# Patient Record
Sex: MALE | Race: WHITE | NOT HISPANIC OR LATINO | Employment: FULL TIME | URBAN - METROPOLITAN AREA
[De-identification: names, ages, dates, MRNs, and addresses within clinical notes are randomized per-mention and may not be internally consistent; named-entity substitution may affect disease eponyms.]

---

## 2021-06-02 ENCOUNTER — RECORDS - HEALTHEAST (OUTPATIENT)
Dept: ADMINISTRATIVE | Facility: CLINIC | Age: 61
End: 2021-06-02

## 2021-10-16 ENCOUNTER — HOSPITAL ENCOUNTER (EMERGENCY)
Facility: CLINIC | Age: 61
Discharge: HOME | End: 2021-10-17
Attending: EMERGENCY MEDICINE | Admitting: EMERGENCY MEDICINE
Payer: COMMERCIAL

## 2021-10-16 ENCOUNTER — APPOINTMENT (OUTPATIENT)
Dept: CT IMAGING | Facility: CLINIC | Age: 61
End: 2021-10-16
Attending: EMERGENCY MEDICINE
Payer: COMMERCIAL

## 2021-10-16 DIAGNOSIS — K59.00 CONSTIPATION, UNSPECIFIED CONSTIPATION TYPE: ICD-10-CM

## 2021-10-16 DIAGNOSIS — R16.0 LIVER MASS, RIGHT LOBE: ICD-10-CM

## 2021-10-16 LAB
ALBUMIN SERPL-MCNC: 3.9 G/DL (ref 3.5–5)
ALP SERPL-CCNC: 82 U/L (ref 45–120)
ALT SERPL W P-5'-P-CCNC: 19 U/L (ref 0–45)
ANION GAP SERPL CALCULATED.3IONS-SCNC: 9 MMOL/L (ref 5–18)
AST SERPL W P-5'-P-CCNC: 21 U/L (ref 0–40)
BASOPHILS # BLD AUTO: 0 10E3/UL (ref 0–0.2)
BASOPHILS NFR BLD AUTO: 0 %
BILIRUB SERPL-MCNC: 0.8 MG/DL (ref 0–1)
BUN SERPL-MCNC: 20 MG/DL (ref 8–22)
CALCIUM SERPL-MCNC: 9.3 MG/DL (ref 8.5–10.5)
CHLORIDE BLD-SCNC: 112 MMOL/L (ref 98–107)
CO2 SERPL-SCNC: 22 MMOL/L (ref 22–31)
CREAT SERPL-MCNC: 1.13 MG/DL (ref 0.7–1.3)
EOSINOPHIL # BLD AUTO: 0.1 10E3/UL (ref 0–0.7)
EOSINOPHIL NFR BLD AUTO: 2 %
ERYTHROCYTE [DISTWIDTH] IN BLOOD BY AUTOMATED COUNT: 13.3 % (ref 10–15)
GFR SERPL CREATININE-BSD FRML MDRD: 70 ML/MIN/1.73M2
GLUCOSE BLD-MCNC: 107 MG/DL (ref 70–125)
HCT VFR BLD AUTO: 41.8 % (ref 40–53)
HGB BLD-MCNC: 14 G/DL (ref 13.3–17.7)
IMM GRANULOCYTES # BLD: 0 10E3/UL
IMM GRANULOCYTES NFR BLD: 0 %
LYMPHOCYTES # BLD AUTO: 1.7 10E3/UL (ref 0.8–5.3)
LYMPHOCYTES NFR BLD AUTO: 23 %
MCH RBC QN AUTO: 29.9 PG (ref 26.5–33)
MCHC RBC AUTO-ENTMCNC: 33.5 G/DL (ref 31.5–36.5)
MCV RBC AUTO: 89 FL (ref 78–100)
MONOCYTES # BLD AUTO: 0.7 10E3/UL (ref 0–1.3)
MONOCYTES NFR BLD AUTO: 9 %
NEUTROPHILS # BLD AUTO: 4.8 10E3/UL (ref 1.6–8.3)
NEUTROPHILS NFR BLD AUTO: 66 %
NRBC # BLD AUTO: 0 10E3/UL
NRBC BLD AUTO-RTO: 0 /100
PLATELET # BLD AUTO: 132 10E3/UL (ref 150–450)
POTASSIUM BLD-SCNC: 4.4 MMOL/L (ref 3.5–5)
PROT SERPL-MCNC: 7 G/DL (ref 6–8)
RBC # BLD AUTO: 4.68 10E6/UL (ref 4.4–5.9)
SODIUM SERPL-SCNC: 143 MMOL/L (ref 136–145)
WBC # BLD AUTO: 7.3 10E3/UL (ref 4–11)

## 2021-10-16 PROCEDURE — 85025 COMPLETE CBC W/AUTO DIFF WBC: CPT | Performed by: EMERGENCY MEDICINE

## 2021-10-16 PROCEDURE — 36415 COLL VENOUS BLD VENIPUNCTURE: CPT | Performed by: EMERGENCY MEDICINE

## 2021-10-16 PROCEDURE — 99285 EMERGENCY DEPT VISIT HI MDM: CPT | Mod: 25

## 2021-10-16 PROCEDURE — 80053 COMPREHEN METABOLIC PANEL: CPT | Performed by: EMERGENCY MEDICINE

## 2021-10-16 PROCEDURE — 74176 CT ABD & PELVIS W/O CONTRAST: CPT

## 2021-10-16 ASSESSMENT — MIFFLIN-ST. JEOR: SCORE: 1847.72

## 2021-10-17 ENCOUNTER — APPOINTMENT (OUTPATIENT)
Dept: CT IMAGING | Facility: CLINIC | Age: 61
End: 2021-10-17
Attending: EMERGENCY MEDICINE
Payer: COMMERCIAL

## 2021-10-17 VITALS
OXYGEN SATURATION: 96 % | TEMPERATURE: 98.1 F | WEIGHT: 225 LBS | RESPIRATION RATE: 16 BRPM | SYSTOLIC BLOOD PRESSURE: 140 MMHG | DIASTOLIC BLOOD PRESSURE: 70 MMHG | HEIGHT: 71 IN | HEART RATE: 57 BPM | BODY MASS INDEX: 31.5 KG/M2

## 2021-10-17 LAB — RADIOLOGIST FLAGS: NORMAL

## 2021-10-17 PROCEDURE — 74177 CT ABD & PELVIS W/CONTRAST: CPT

## 2021-10-17 PROCEDURE — 250N000011 HC RX IP 250 OP 636: Performed by: EMERGENCY MEDICINE

## 2021-10-17 RX ORDER — IOPAMIDOL 755 MG/ML
100 INJECTION, SOLUTION INTRAVASCULAR ONCE
Status: COMPLETED | OUTPATIENT
Start: 2021-10-17 | End: 2021-10-17

## 2021-10-17 RX ADMIN — IOPAMIDOL 100 ML: 755 INJECTION, SOLUTION INTRAVENOUS at 00:15

## 2021-10-17 NOTE — ED PROVIDER NOTES
"ED SIGNOUT  Date/Time:10/16/2021 11:29 PM    Patient signed out to me, Dr. Jeff, by my colleague, Dr. Olivarez.  Please see their note for complete history and physical. Plan to follow up on CT Abdomen Pelvis with contrast and disposition.    The creation of this record is based on the scribe s observations of the work being performed by Anne Carlisle and the provider s statements to them. It was created on their behalf by Anne Carlisle a trained medical scribe. This document has been checked and approved by the attending provider.      REMAINING ED WORKUP:    Vitals:  BP (!) 141/71   Pulse 53   Temp 98.1  F (36.7  C) (Oral)   Resp 16   Ht 1.803 m (5' 11\")   Wt 102.1 kg (225 lb)   SpO2 97%   BMI 31.38 kg/m        Pertinent labs results reviewed   Results for orders placed or performed during the hospital encounter of 10/16/21   Abd/pelvis CT no contrast - Stone Protocol    Impression    IMPRESSION:   1.  Tiny, incidental fat-containing umbilical hernia.  2.  Constipation.  3.  Marked gastric distention with recent meal.  4.  Indeterminate low-attenuation mass right hepatic lobe. Repeat CT study after IV contrast recommended to further assess.     CT Abdomen Pelvis w Contrast    Impression    IMPRESSION:   1.  The large low-attenuation lesion in the right hepatic lobe is somewhat suboptimally evaluated given that there is no arterial phase imaging. However, its imaging characteristics are still most highly compatible with a benign hemangioma measuring 4.8   x 4.1 x 4.4 cm. There is peripheral globular enhancement, some of which is slightly more ill-defined at the inferior margin.    2.  There remains an indeterminant 1.3 cm lesion along the medial aspect of the right hepatic lobe which could represent a smaller hemangioma. Additionally, there is a 1 cm lesion along the left hepatic lobe lateral segment superiorly which remains   indeterminate as it is not well-visualized on the single delayed phase imaging due " to motion artifact. Given these findings and the lack of an arterial phase component to this study, recommend follow-up nonemergent MRI of the liver to confirm the   etiology of these two entities. Other tiny subcentimeter low-attenuation lesions possibly represent cysts and should be further confirmed on the MRI.    3.  1.6 cm right adrenal mass is indeterminate based off of this study. However, review of the noncontrast CT performed on 10/16/2021 at 8:48 PM demonstrates Hounsfield units for this lesion at less than 10 confirming a benign right adrenal adenoma for   which no long term follow-up is recommended.   Comprehensive metabolic panel   Result Value Ref Range    Sodium 143 136 - 145 mmol/L    Potassium 4.4 3.5 - 5.0 mmol/L    Chloride 112 (H) 98 - 107 mmol/L    Carbon Dioxide (CO2) 22 22 - 31 mmol/L    Anion Gap 9 5 - 18 mmol/L    Urea Nitrogen 20 8 - 22 mg/dL    Creatinine 1.13 0.70 - 1.30 mg/dL    Calcium 9.3 8.5 - 10.5 mg/dL    Glucose 107 70 - 125 mg/dL    Alkaline Phosphatase 82 45 - 120 U/L    AST 21 0 - 40 U/L    ALT 19 0 - 45 U/L    Protein Total 7.0 6.0 - 8.0 g/dL    Albumin 3.9 3.5 - 5.0 g/dL    Bilirubin Total 0.8 0.0 - 1.0 mg/dL    GFR Estimate 70 >60 mL/min/1.73m2   CBC with platelets and differential   Result Value Ref Range    WBC Count 7.3 4.0 - 11.0 10e3/uL    RBC Count 4.68 4.40 - 5.90 10e6/uL    Hemoglobin 14.0 13.3 - 17.7 g/dL    Hematocrit 41.8 40.0 - 53.0 %    MCV 89 78 - 100 fL    MCH 29.9 26.5 - 33.0 pg    MCHC 33.5 31.5 - 36.5 g/dL    RDW 13.3 10.0 - 15.0 %    Platelet Count 132 (L) 150 - 450 10e3/uL    % Neutrophils 66 %    % Lymphocytes 23 %    % Monocytes 9 %    % Eosinophils 2 %    % Basophils 0 %    % Immature Granulocytes 0 %    NRBCs per 100 WBC 0 <1 /100    Absolute Neutrophils 4.8 1.6 - 8.3 10e3/uL    Absolute Lymphocytes 1.7 0.8 - 5.3 10e3/uL    Absolute Monocytes 0.7 0.0 - 1.3 10e3/uL    Absolute Eosinophils 0.1 0.0 - 0.7 10e3/uL    Absolute Basophils 0.0 0.0 - 0.2 10e3/uL     Absolute Immature Granulocytes 0.0 <=0.0 10e3/uL    Absolute NRBCs 0.0 10e3/uL       Pertinent imaging reviewed   Please see official radiology report.  CT Abdomen Pelvis w Contrast   Preliminary Result   IMPRESSION:    1.  The large low-attenuation lesion in the right hepatic lobe is somewhat suboptimally evaluated given that there is no arterial phase imaging. However, its imaging characteristics are still most highly compatible with a benign hemangioma measuring 4.8    x 4.1 x 4.4 cm. There is peripheral globular enhancement, some of which is slightly more ill-defined at the inferior margin.      2.  There remains an indeterminant 1.3 cm lesion along the medial aspect of the right hepatic lobe which could represent a smaller hemangioma. Additionally, there is a 1 cm lesion along the left hepatic lobe lateral segment superiorly which remains    indeterminate as it is not well-visualized on the single delayed phase imaging due to motion artifact. Given these findings and the lack of an arterial phase component to this study, recommend follow-up nonemergent MRI of the liver to confirm the    etiology of these two entities. Other tiny subcentimeter low-attenuation lesions possibly represent cysts and should be further confirmed on the MRI.      3.  1.6 cm right adrenal mass is indeterminate based off of this study. However, review of the noncontrast CT performed on 10/16/2021 at 8:48 PM demonstrates Hounsfield units for this lesion at less than 10 confirming a benign right adrenal adenoma for    which no long term follow-up is recommended.      Abd/pelvis CT no contrast - Stone Protocol   Final Result   IMPRESSION:    1.  Tiny, incidental fat-containing umbilical hernia.   2.  Constipation.   3.  Marked gastric distention with recent meal.   4.  Indeterminate low-attenuation mass right hepatic lobe. Repeat CT study after IV contrast recommended to further assess.              Interventions  Medications   iopamidol  (ISOVUE-370) solution 100 mL (100 mLs Intravenous Given 10/17/21 0015)        ED Course/MDM:  11:30 PM Signout accepted from Dr. Olivarez.  Prior records were reviewed.  Diagnostics from this visit are reviewed.  1:14 AM I rechecked and introduced myself to the patient.     CT scans here without any acute process noted.  CT scan with contrast shows likely hemangioma.  May need outpatient routine follow-up MRI for confirmation but otherwise no focal or acute issues noted.  Went through the CT scans with patient in the room.  Showed him that he does have some significant constipation.  Discussed with him he can try magnesium citrate or MiraLAX which he can buy over-the-counter and he is going to try that at home.  Otherwise has an appointment coming up with primary care within the next week.  Will discharge at this time with close follow-up.           1. Liver mass, right lobe    2. Constipation, unspecified constipation type          Becky Jeff MD  [facility] Emergency Department        Erica Jeff MD  10/17/21 0125

## 2021-10-17 NOTE — ED PROVIDER NOTES
"EMERGENCY DEPARTMENT ENCOUnter      NAME: Sam Segundo  AGE: 61 year old male  YOB: 1960  MRN: [id number]  EVALUATION DATE & TIME: 10/16/2021  8:19 PM    PCP: No primary care provider on file.    ED PROVIDER: Maribell Olivarez MD      Chief Complaint   Patient presents with     Left side Abdominal swelling         FINAL IMPRESSION:  1. Liver mass, right lobe    2. Constipation, unspecified constipation type          ED COURSE & MEDICAL DECISION MAKING:      In summary, the patient is a 61-year-old male that presents to the emergency department for evaluation of a possible abdominal mass on his left side. No left-sided mass was noted on the CT scan, however a hepatic lesion was noted and a CT with contrast was recommended.    8:28 PM I met with the patient, obtained history, performed an initial exam, and discussed options and plan for diagnostics and treatment here in the ED.  9:53 PM I updated patient.   2320-and out at change of shift with CT abdomen pelvis with contrast pending    At the conclusion of the encounter I discussed the results of all of the tests and the disposition. The questions were answered. The patient or family acknowledged understanding and was agreeable with the care plan.     MEDICATIONS GIVEN IN THE EMERGENCY:  Medications - No data to display    NEW PRESCRIPTIONS STARTED AT TODAY'S ER VISIT  New Prescriptions    No medications on file          =================================================================    HPI        Sam Segundo is a 61 year old male with a pertinent history of dyslipidemia, HTN, paroxysmal atrial fibrillation, who presents to this ED via walk-in for evaluation of left-side abdominal swelling.     Patient reports \"pouch sticking out\" on left lower abdomen that he noticed earlier today. Denies any associating pain. Patient reports he felt fine earlier today before he noticed it. He reports decreased bowel movements for the past two days, which " "is unusual for patient as he states they are \"clockwork.\"  He notes his last bowel movement was today but less than usual.  Per wife, shortly after arrival to ED, patient's \"pouch\" was pulsating.    He reports back pain for the past couple days. Patient reports history of atrial fibrillation in remission after prior ablation. Denies any abdominal surgeries. Patient is currently taking fish oil and baby aspirin every day. Patient is not on blood thinners. Patient denies any alcohol use, smoking, or allergies to medications. No other complaints at this time.     REVIEW OF SYSTEMS     Constitutional:  Denies fever or chills  HENT:  Denies sore throat   Respiratory:  Denies cough or shortness of breath   Cardiovascular:  Denies chest pain or palpitations  GI:  Denies abdominal pain, nausea, or vomiting. Positive for abdominal \"pouch\" on left lower quadrant and decreased bowel movements.  Musculoskeletal:  Denies any new extremity pain. Positive for back pain.  Skin:  Denies rash   Neurologic:  Denies headache, focal weakness or sensory changes    All other systems reviewed and are negative      PAST MEDICAL HISTORY:  History reviewed. No pertinent past medical history.    PAST SURGICAL HISTORY:  History reviewed. No pertinent surgical history.        CURRENT MEDICATIONS:    aspirin 81 MG EC tablet  cholecalciferol, vitamin D3, 1,000 unit tablet  fenofibrate (TRIGLIDE) 50 MG tablet  melatonin 3 mg Tab tablet  multivitamin therapeutic (THERAGRAN) tablet  OMEGA-3/DHA/EPA/FISH OIL (FISH OIL-OMEGA-3 FATTY ACIDS) 300-1,000 mg capsule        ALLERGIES:  No Known Allergies    FAMILY HISTORY:  History reviewed. No pertinent family history.    SOCIAL HISTORY:   Social History     Socioeconomic History     Marital status:      Spouse name: None     Number of children: None     Years of education: None     Highest education level: None   Occupational History     None   Tobacco Use     Smoking status: None   Substance and " "Sexual Activity     Alcohol use: None     Drug use: None     Sexual activity: None   Other Topics Concern     None   Social History Narrative     None     Social Determinants of Health     Financial Resource Strain:      Difficulty of Paying Living Expenses:    Food Insecurity:      Worried About Running Out of Food in the Last Year:      Ran Out of Food in the Last Year:    Transportation Needs:      Lack of Transportation (Medical):      Lack of Transportation (Non-Medical):    Physical Activity:      Days of Exercise per Week:      Minutes of Exercise per Session:    Stress:      Feeling of Stress :    Social Connections:      Frequency of Communication with Friends and Family:      Frequency of Social Gatherings with Friends and Family:      Attends Scientologist Services:      Active Member of Clubs or Organizations:      Attends Club or Organization Meetings:      Marital Status:    Intimate Partner Violence:      Fear of Current or Ex-Partner:      Emotionally Abused:      Physically Abused:      Sexually Abused:        VITALS:  Patient Vitals for the past 24 hrs:   BP Temp Temp src Pulse Resp SpO2 Height Weight   10/16/21 2209 -- -- -- 56 -- 96 % -- --   10/16/21 2030 (!) 141/71 -- -- 52 -- 96 % -- --   10/16/21 2026 (!) 150/72 -- -- 54 -- 98 % -- --   10/16/21 1822 (!) 169/87 98.1  F (36.7  C) Oral 55 16 99 % 1.803 m (5' 11\") 102.1 kg (225 lb)       PHYSICAL EXAM    Constitutional:  Well developed, Well nourished,  HENT:  Normocephalic, Atraumatic, Bilateral external ears normal, Oropharynx moist, Nose normal.   Neck:  Normal range of motion, No meningismus, No stridor.   Eyes:  EOMI, Conjunctiva normal, No discharge.   Respiratory:  Normal breath sounds, No respiratory distress, No wheezing, No chest tenderness.   Cardiovascular:  Normal heart rate, Normal rhythm, No murmurs  GI:  Soft, No tenderness, No guarding, No CVA tenderness.   Musculoskeletal:  Neurovascularly intact distally, No edema, No tenderness, " No cyanosis, Good range of motion in all major joints. No tenderness to palpation or major deformities noted.   Integument:  Warm, Dry, No erythema, No rash.   Lymphatic:  No lymphadenopathy noted.   Neurologic:  Alert & oriented x 3, Normal motor function, Normal sensory function, No focal deficits noted.   Psychiatric:  Affect normal, Judgment normal, Mood normal.      LAB:  All pertinent labs reviewed and interpreted.  Results for orders placed or performed during the hospital encounter of 10/16/21   Abd/pelvis CT no contrast - Stone Protocol    Impression    IMPRESSION:   1.  Tiny, incidental fat-containing umbilical hernia.  2.  Constipation.  3.  Marked gastric distention with recent meal.  4.  Indeterminate low-attenuation mass right hepatic lobe. Repeat CT study after IV contrast recommended to further assess.     Comprehensive metabolic panel   Result Value Ref Range    Sodium 143 136 - 145 mmol/L    Potassium 4.4 3.5 - 5.0 mmol/L    Chloride 112 (H) 98 - 107 mmol/L    Carbon Dioxide (CO2) 22 22 - 31 mmol/L    Anion Gap 9 5 - 18 mmol/L    Urea Nitrogen 20 8 - 22 mg/dL    Creatinine 1.13 0.70 - 1.30 mg/dL    Calcium 9.3 8.5 - 10.5 mg/dL    Glucose 107 70 - 125 mg/dL    Alkaline Phosphatase 82 45 - 120 U/L    AST 21 0 - 40 U/L    ALT 19 0 - 45 U/L    Protein Total 7.0 6.0 - 8.0 g/dL    Albumin 3.9 3.5 - 5.0 g/dL    Bilirubin Total 0.8 0.0 - 1.0 mg/dL    GFR Estimate 70 >60 mL/min/1.73m2   CBC with platelets and differential   Result Value Ref Range    WBC Count 7.3 4.0 - 11.0 10e3/uL    RBC Count 4.68 4.40 - 5.90 10e6/uL    Hemoglobin 14.0 13.3 - 17.7 g/dL    Hematocrit 41.8 40.0 - 53.0 %    MCV 89 78 - 100 fL    MCH 29.9 26.5 - 33.0 pg    MCHC 33.5 31.5 - 36.5 g/dL    RDW 13.3 10.0 - 15.0 %    Platelet Count 132 (L) 150 - 450 10e3/uL    % Neutrophils 66 %    % Lymphocytes 23 %    % Monocytes 9 %    % Eosinophils 2 %    % Basophils 0 %    % Immature Granulocytes 0 %    NRBCs per 100 WBC 0 <1 /100    Absolute  Neutrophils 4.8 1.6 - 8.3 10e3/uL    Absolute Lymphocytes 1.7 0.8 - 5.3 10e3/uL    Absolute Monocytes 0.7 0.0 - 1.3 10e3/uL    Absolute Eosinophils 0.1 0.0 - 0.7 10e3/uL    Absolute Basophils 0.0 0.0 - 0.2 10e3/uL    Absolute Immature Granulocytes 0.0 <=0.0 10e3/uL    Absolute NRBCs 0.0 10e3/uL       RADIOLOGY:  I have independently reviewed and interpreted the above imaging, pending the final radiology read.  Abd/pelvis CT no contrast - Stone Protocol   Final Result   IMPRESSION:    1.  Tiny, incidental fat-containing umbilical hernia.   2.  Constipation.   3.  Marked gastric distention with recent meal.   4.  Indeterminate low-attenuation mass right hepatic lobe. Repeat CT study after IV contrast recommended to further assess.         CT Abdomen Pelvis w Contrast    (Results Pending)             I, Anne Carlisle, am serving as a scribe to document services personally performed by Dr. Olivarez based on my observation and the provider's statements to me. I, Maribell Olivarez MD attest that Anne Carlisle is acting in a scribe capacity, has observed my performance of the services and has documented them in accordance with my direction.    Maribell Olivarez MD  Emergency Medicine  [facility] EMERGENCY ROOM  [id number] [facility] [id number] [phone]  Dept: [phone]     Maribell Olivarez MD  10/16/21 0900